# Patient Record
Sex: FEMALE | Race: OTHER | HISPANIC OR LATINO | URBAN - METROPOLITAN AREA
[De-identification: names, ages, dates, MRNs, and addresses within clinical notes are randomized per-mention and may not be internally consistent; named-entity substitution may affect disease eponyms.]

---

## 2018-09-01 ENCOUNTER — OUTPATIENT (OUTPATIENT)
Dept: OUTPATIENT SERVICES | Facility: HOSPITAL | Age: 43
LOS: 1 days | End: 2018-09-01
Payer: MEDICAID

## 2018-09-01 PROCEDURE — G9001: CPT

## 2018-09-10 ENCOUNTER — EMERGENCY (EMERGENCY)
Facility: HOSPITAL | Age: 43
LOS: 1 days | Discharge: HOME | End: 2018-09-10
Attending: EMERGENCY MEDICINE
Payer: MEDICAID

## 2018-09-10 VITALS
OXYGEN SATURATION: 99 % | HEART RATE: 76 BPM | RESPIRATION RATE: 16 BRPM | DIASTOLIC BLOOD PRESSURE: 76 MMHG | SYSTOLIC BLOOD PRESSURE: 106 MMHG | TEMPERATURE: 98 F

## 2018-09-10 VITALS
WEIGHT: 158.95 LBS | TEMPERATURE: 99 F | DIASTOLIC BLOOD PRESSURE: 84 MMHG | HEART RATE: 123 BPM | RESPIRATION RATE: 16 BRPM | SYSTOLIC BLOOD PRESSURE: 137 MMHG | OXYGEN SATURATION: 98 % | HEIGHT: 65 IN

## 2018-09-10 PROCEDURE — 99285 EMERGENCY DEPT VISIT HI MDM: CPT

## 2018-09-10 PROCEDURE — 99283 EMERGENCY DEPT VISIT LOW MDM: CPT | Mod: 25

## 2018-09-10 PROCEDURE — 71046 X-RAY EXAM CHEST 2 VIEWS: CPT

## 2018-09-10 PROCEDURE — 71046 X-RAY EXAM CHEST 2 VIEWS: CPT | Mod: 26

## 2018-09-10 RX ORDER — ACETAMINOPHEN 500 MG
975 TABLET ORAL ONCE
Qty: 0 | Refills: 0 | Status: COMPLETED | OUTPATIENT
Start: 2018-09-10 | End: 2018-09-10

## 2018-09-10 RX ORDER — IBUPROFEN 200 MG
600 TABLET ORAL ONCE
Qty: 0 | Refills: 0 | Status: COMPLETED | OUTPATIENT
Start: 2018-09-10 | End: 2018-09-10

## 2018-09-10 RX ADMIN — Medication 600 MILLIGRAM(S): at 08:46

## 2018-09-10 RX ADMIN — Medication 600 MILLIGRAM(S): at 10:12

## 2018-09-10 RX ADMIN — Medication 975 MILLIGRAM(S): at 10:11
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
## 2018-10-04 DIAGNOSIS — Z71.89 OTHER SPECIFIED COUNSELING: ICD-10-CM

## 2019-09-16 NOTE — ED ADULT NURSE NOTE - EXTENSIONS OF SELF_ADULT
I called Sherly today to discuss updates to her previous genetic test results.  Her blood was previously drawn on 7/27/2018.  OvaNext testing was ordered from [employer]. This testing was done because of her family history of breast and ovarian cancer.  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     Recently [employer] contacted me with a new report.  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      As Sherly was not available, I left a non-detailed voicemail message with my contact information.  I can be reached at [phone].    Petrona Melendez MS, [facility]  Licensed Genetic Counselor  [phone]            
None

## 2020-01-21 NOTE — ED ADULT NURSE NOTE - NSIMPLEMENTINTERV_GEN_ALL_ED
no Implemented All Universal Safety Interventions:  [place] to call system. Call bell, personal items and telephone within reach. Instruct patient to call for assistance. Room bathroom lighting operational. Non-slip footwear when patient is off stretcher. Physically safe environment: no spills, clutter or unnecessary equipment. Stretcher in lowest position, wheels locked, appropriate side rails in place.
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
## 2025-02-28 NOTE — ED ADULT TRIAGE NOTE - ACCOMPANIED BY
Chronic Pain/PM&R Clinic Note     Encounter Date: 2/28/25    Subjective:   Chief Complaint:   Chief Complaint   Patient presents with    Botox Injection     Botox 200 units migraine *No PA Required**         History of Present Illness:   Tisha Fitch is a 66 y.o. female seen in the clinic initially on 05/02/22 upon request from Belem De Luna MD (PCP) for her history of chronic neck pain and migraines.  She has a medical history of 2 previous cervical neck surgeries including a C3/C4 and C7/T1 anterior cervical disc fusions.  She has been dealing with migraines ever since she was a teenager.  She states that she has been noticing worsening pain on the right side of her neck over the last few months without any inciting event.  She states this pain is a constant 5/10 shooting, throbbing, stabbing pain.  She states the pain will start at the base of her occiput on the right-hand side and radiate around to the top of her scalp around to her right eye and to her ear.  She states that this can be worse with mastication but denies any associated blurry vision, double vision, ringing in the ears, or issues with photophobia or phonophobia.  She states that she does have some very intermittent numbness/tingling in her left hand.  She denies any worsening balance/gait disturbances.    She states that she has been managing her pain with the use of Excedrin, Maxalt, Topamax, baclofen.  She states she was close to having Botox done for her migraines but this was never pursued when she had to have her second surgery.    Today, 2/28/2025, patient presents for planned follow-up for management of ongoing neck pain and migraine headaches.  She continues to follow well to her Botox injections for her migraine headaches.  She would like to repeat her Botox injections at this office visit.  She denies any changes presentation her migraine headaches or medications used to treat her migraines that is different.  She states she did 
Functionality Assessment/Goals Worksheet     On a scale of 0 (Does not Interfere) to 10 (Completely Interferes)     1.  Which number describes how during the past week pain has interfered with           the following:  A.  General Activity:  4  B.  Mood: 4  C.  Walking Ability:  0  D.  Normal Work (Includes both work outside the home and housework):  4  E.  Relations with Other People:   0  F.  Sleep:   4  G.  Enjoyment of Life:   3    2.  Patient Prefers to Take their Pain Medications:     []  On a regular basis   [x]  Only when necessary    []  Does not take pain medications    3.  What are the Patient's Goals/Expectations for Visiting Pain Management?     []  Learn about my pain    []  Receive Medication   []  Physical Therapy     []  Treat Depression   [x]  Receive Injections    []  Treat Sleep   []  Deal with Anxiety and Stress   []  Treat Opoid Dependence/Addiction   []  Other:                                
Pre-operative Diagnosis: Chronic Migraine Headaches     Post-operative Diagnosis: Chronic Migraine Headaches     Procedure: Botox for migraine headaches     Procedure Description:  Patient was reclined on the examination table. Botox was drawn up into a tuberculin syringes, to a concentration of 5 units per 0.1 mL with a 30-gauge needle. Skin was prepped with alcohol wipes. The following muscles were injected after negative aspiration; The frontalis muscle bilaterally a total of 4 sites (20 units), The  muscle bilaterally a total of 2 sites (10 units), the procerus one site (5 units), the occipitalis bilaterally a total of 6 sites (30 units), The temporalis muscle bilaterally a total of 8 sites (40 units), the massetter muscles bilaterally (20 units), the trapezius bilaterally a total of 6 sites (30 units), and cervical paraspinal muscle groups a total of (45 units). This was a total of 200 units. The patient tolerated the procedure well.    Medications: 4 mL in 200 U Botox drawn up in 4 separate 1 mL TB syringes = 5 U per 0.1 mL syringe    Amount medication wasted: 0 units        Procedural Complications: None        Frankie Garber DO  Interventional Pain Management/PM&R   [facility]     
EMT/paramedic